# Patient Record
Sex: FEMALE | Race: WHITE | NOT HISPANIC OR LATINO
[De-identification: names, ages, dates, MRNs, and addresses within clinical notes are randomized per-mention and may not be internally consistent; named-entity substitution may affect disease eponyms.]

---

## 2018-06-27 ENCOUNTER — RESULT REVIEW (OUTPATIENT)
Age: 58
End: 2018-06-27

## 2018-08-20 ENCOUNTER — RESULT REVIEW (OUTPATIENT)
Age: 58
End: 2018-08-20

## 2022-06-15 ENCOUNTER — RESULT REVIEW (OUTPATIENT)
Age: 62
End: 2022-06-15

## 2022-06-20 PROBLEM — Z00.00 ENCOUNTER FOR PREVENTIVE HEALTH EXAMINATION: Status: ACTIVE | Noted: 2022-06-20

## 2022-06-29 ENCOUNTER — APPOINTMENT (OUTPATIENT)
Dept: UROGYNECOLOGY | Facility: CLINIC | Age: 62
End: 2022-06-29

## 2022-06-29 DIAGNOSIS — N36.41 HYPERMOBILITY OF URETHRA: ICD-10-CM

## 2022-06-29 DIAGNOSIS — R39.198 OTHER DIFFICULTIES WITH MICTURITION: ICD-10-CM

## 2022-06-29 PROCEDURE — 99204 OFFICE O/P NEW MOD 45 MIN: CPT | Mod: 25

## 2022-06-29 PROCEDURE — 51701 INSERT BLADDER CATHETER: CPT

## 2022-06-30 ENCOUNTER — NON-APPOINTMENT (OUTPATIENT)
Age: 62
End: 2022-06-30

## 2022-06-30 LAB
APPEARANCE: CLEAR
BACTERIA: NEGATIVE
BILIRUBIN URINE: NEGATIVE
BLOOD URINE: NEGATIVE
COLOR: COLORLESS
GLUCOSE QUALITATIVE U: NEGATIVE
HYALINE CASTS: 0 /LPF
KETONES URINE: NEGATIVE
LEUKOCYTE ESTERASE URINE: NEGATIVE
MICROSCOPIC-UA: NORMAL
NITRITE URINE: NEGATIVE
PH URINE: 7
PROTEIN URINE: NEGATIVE
RED BLOOD CELLS URINE: 0 /HPF
SPECIFIC GRAVITY URINE: 1.01
SQUAMOUS EPITHELIAL CELLS: 0 /HPF
UROBILINOGEN URINE: NORMAL
WHITE BLOOD CELLS URINE: 0 /HPF

## 2022-07-01 DIAGNOSIS — Z78.9 OTHER SPECIFIED HEALTH STATUS: ICD-10-CM

## 2022-07-01 DIAGNOSIS — Z82.49 FAMILY HISTORY OF ISCHEMIC HEART DISEASE AND OTHER DISEASES OF THE CIRCULATORY SYSTEM: ICD-10-CM

## 2022-07-01 RX ORDER — SULFAMETHOXAZOLE AND TRIMETHOPRIM 800; 160 MG/1; MG/1
800-160 TABLET ORAL
Qty: 6 | Refills: 0 | Status: ACTIVE | COMMUNITY
Start: 2022-07-01 | End: 1900-01-01

## 2022-07-08 NOTE — PROCEDURE
[FreeTextEntry1] : A catheterized urine was performed to rule out urinary tract infection and/or retention.  We reviewed that urine dipstick was positive for trace blood and we will send off for urinalysis with microscopy and culture.

## 2022-07-08 NOTE — HISTORY OF PRESENT ILLNESS
[Vaginal Wall Prolapse] : no [] : months ago [Rectal Prolapse] : no [Unable To Restrain Bowel Movement] : no [Urinary Frequency] : no [Urinary Tract Infection] : no [Feelings Of Urinary Urgency] : no [Urinary Frequency More Than Twice At Night (Nocturia)] : no nocturia [FreeTextEntry3] : sometimes  [FreeTextEntry5] : daily  [de-identified] : couple [de-identified] : sometimes  [de-identified] : + sexually active

## 2022-07-08 NOTE — DISCUSSION/SUMMARY
[FreeTextEntry1] : I reviewed the above findings with the patient and her daughter, Tita<  with visual illustrations. Treatment options for the prolapse were discussed and included doing nothing, Kegel exercises and behavioral modification, a pessary, or surgical correction.Surgically we discussed the abdominal vs the vaginal routes. Abdominally we discussed a hysterectomy and a sacral colpopexy   laparoscopically and robotically.  Vaginally we discussed a vaginal hysterectomy, uterosacral suspension, and anterior/posterior repair. Surgically we discussed hysteropexy as well as the use of biologics.  We discussed the pelvic ultrasound and urodynamic testing if she is interested in surgical correction.  She would like to think about her options and will call the office if she decides to proceed with treatment.  I UG a patient information on pelvic organ prolapse was given to her.  All questions were answered.\par

## 2022-07-08 NOTE — PHYSICAL EXAM
[Chaperone Present] : A chaperone was present in the examining room during all aspects of the physical examination [No Acute Distress] : in no acute distress [Well developed] : well developed [Well Nourished] : ~L well nourished [Normal Mood/Affect] : mood and affect are normal [Respirations regular] : ~T respiratory rate was regular [Warm and Dry] : was warm and dry to touch [Vulvar Atrophy] : vulvar atrophy [Normal Appearance] : general appearance was normal [Normal] : normal [Cystocele] : a cystocele [Uterine Prolapse] : uterine prolapse [Aa ____] : Aa [unfilled] [Ba ____] : Ba [unfilled] [C ____] : C [unfilled] [GH ____] : GH [unfilled] [PB ____] : PB [unfilled] [TVL ____] : TVL  [unfilled] [Ap ____] : Ap [unfilled] [Bp ____] : Bp [unfilled] [D ____] : D [unfilled] [Post Void Residual ____ml] : post void residual was [unfilled] ml [Normal rectal exam] : was normal [Anxiety] : patient is not anxious [Mass (___ Cm)] : no ~M [unfilled] abdominal mass was palpated [Tenderness] : ~T no ~M abdominal tenderness observed [Distended] : not distended [FreeTextEntry3] : + hypermobility

## 2022-08-04 ENCOUNTER — RESULT REVIEW (OUTPATIENT)
Age: 62
End: 2022-08-04

## 2022-08-04 ENCOUNTER — APPOINTMENT (OUTPATIENT)
Dept: ULTRASOUND IMAGING | Facility: IMAGING CENTER | Age: 62
End: 2022-08-04

## 2022-08-04 ENCOUNTER — OUTPATIENT (OUTPATIENT)
Dept: OUTPATIENT SERVICES | Facility: HOSPITAL | Age: 62
LOS: 1 days | End: 2022-08-04
Payer: MEDICAID

## 2022-08-04 DIAGNOSIS — N81.2 INCOMPLETE UTEROVAGINAL PROLAPSE: ICD-10-CM

## 2022-08-04 PROCEDURE — 76830 TRANSVAGINAL US NON-OB: CPT

## 2022-08-04 PROCEDURE — 76830 TRANSVAGINAL US NON-OB: CPT | Mod: 26

## 2022-08-04 PROCEDURE — 76856 US EXAM PELVIC COMPLETE: CPT

## 2022-08-04 PROCEDURE — 76856 US EXAM PELVIC COMPLETE: CPT | Mod: 26

## 2022-08-10 ENCOUNTER — NON-APPOINTMENT (OUTPATIENT)
Age: 62
End: 2022-08-10

## 2022-08-17 ENCOUNTER — OUTPATIENT (OUTPATIENT)
Dept: OUTPATIENT SERVICES | Facility: HOSPITAL | Age: 62
LOS: 1 days | End: 2022-08-17
Payer: MEDICAID

## 2022-08-17 ENCOUNTER — APPOINTMENT (OUTPATIENT)
Dept: UROGYNECOLOGY | Facility: CLINIC | Age: 62
End: 2022-08-17

## 2022-08-17 DIAGNOSIS — R31.9 HEMATURIA, UNSPECIFIED: ICD-10-CM

## 2022-08-17 LAB
BILIRUB UR QL STRIP: NEGATIVE
CLARITY UR: CLEAR
COLLECTION METHOD: NORMAL
GLUCOSE UR-MCNC: NEGATIVE
HCG UR QL: 0.2 EU/DL
HGB UR QL STRIP.AUTO: NORMAL
KETONES UR-MCNC: NEGATIVE
LEUKOCYTE ESTERASE UR QL STRIP: NEGATIVE
NITRITE UR QL STRIP: NEGATIVE
PH UR STRIP: 7
PROT UR STRIP-MCNC: NEGATIVE
SP GR UR STRIP: 1.01

## 2022-08-17 PROCEDURE — 51729 CYSTOMETROGRAM W/VP&UP: CPT

## 2022-08-17 PROCEDURE — 51797 INTRAABDOMINAL PRESSURE TEST: CPT | Mod: 26

## 2022-08-17 PROCEDURE — 51797 INTRAABDOMINAL PRESSURE TEST: CPT

## 2022-08-17 PROCEDURE — 51784 ANAL/URINARY MUSCLE STUDY: CPT | Mod: 26

## 2022-08-17 PROCEDURE — 51784 ANAL/URINARY MUSCLE STUDY: CPT

## 2022-08-17 PROCEDURE — 51729 CYSTOMETROGRAM W/VP&UP: CPT | Mod: 26

## 2022-08-18 ENCOUNTER — NON-APPOINTMENT (OUTPATIENT)
Age: 62
End: 2022-08-18

## 2022-08-19 LAB — BACTERIA UR CULT: NORMAL

## 2022-08-31 DIAGNOSIS — Z01.818 ENCOUNTER FOR OTHER PREPROCEDURAL EXAMINATION: ICD-10-CM

## 2022-11-02 ENCOUNTER — APPOINTMENT (OUTPATIENT)
Dept: UROGYNECOLOGY | Facility: CLINIC | Age: 62
End: 2022-11-02

## 2022-11-02 PROCEDURE — 99214 OFFICE O/P EST MOD 30 MIN: CPT

## 2022-11-03 NOTE — HISTORY OF PRESENT ILLNESS
[FreeTextEntry1] : Bárbara is a 61yo with incomplete uterovaginal prolapse, cystocele and occult HAYDEN who presents today for follow up. She is considering surgical intervention for her prolapse. She denies any changes since last visit. \par \par She recently underwent UDS on 8/17/22 which revealed +-300cc, no DO, longterm 347cc, PVR 0cc. She denies experiencing any HAYDEN at this time with the prolapse. \par \par PMH: HTN \par

## 2022-11-03 NOTE — DISCUSSION/SUMMARY
[FreeTextEntry1] : We reviewed the above findings with the patient with visual illustrations. Treatment options for the prolapse were discussed and included doing nothing, Kegel exercises and behavioral modification, a pessary, or surgical correction. Surgically we discussed the abdominal vs the vaginal routes. Abdominally we discussed a hysterectomy and a sacral colpopexy   laparoscopically and robotically.  Vaginally we discussed a vaginal hysterectomy, uterosacral suspension, and anterior/posterior repair. Surgically we discussed hysteropexy as well as the use of biologics. She would like to proceed with abdominal robotic approach with hysterectomy and sacrocolpopexy. \par \par We also discussed UDS findings of HAYDEN. We reviewed treatment options for the stress incontinence and included doing nothing, behavioral modification, Kegel's exercises with and without PT, medications, a pessary or intravaginal devices, periurethral bulking, and surgical correction with a suburethral sling v [place] v autologous sling. She is interested in pursuing a sling at the time of surgery. We also reviewed that while no evidence of detrusor instability, it does not mean it doesn't exist or may develop in the future. \par  \par [facility] handout on sacrocolpopexy and MUS provided.

## 2022-11-21 ENCOUNTER — APPOINTMENT (OUTPATIENT)
Dept: UROGYNECOLOGY | Facility: CLINIC | Age: 62
End: 2022-11-21

## 2022-11-21 VITALS
TEMPERATURE: 96.9 F | WEIGHT: 159 LBS | HEIGHT: 64 IN | DIASTOLIC BLOOD PRESSURE: 72 MMHG | SYSTOLIC BLOOD PRESSURE: 140 MMHG | BODY MASS INDEX: 27.14 KG/M2

## 2022-11-21 PROCEDURE — 51701 INSERT BLADDER CATHETER: CPT

## 2022-11-21 PROCEDURE — 99214 OFFICE O/P EST MOD 30 MIN: CPT | Mod: 25

## 2022-11-21 NOTE — PHYSICAL EXAM
[Chaperone Present] : A chaperone was present in the examining room during all aspects of the physical examination [No Acute Distress] : in no acute distress [Well developed] : well developed [Well Nourished] : ~L well nourished [Normal Mood/Affect] : mood and affect are normal [Respirations regular] : ~T respiratory rate was regular [Warm and Dry] : was warm and dry to touch [Vulvar Atrophy] : vulvar atrophy [Labia Majora] : were normal [Labia Minora] : were normal [Normal Appearance] : general appearance was normal [Cystocele] : a cystocele [Uterine Prolapse] : uterine prolapse [Aa ____] : Aa [unfilled] [Ba ____] : Ba [unfilled] [C ____] : C [unfilled] [GH ____] : GH [unfilled] [PB ____] : PB [unfilled] [TVL ____] : TVL  [unfilled] [Ap ____] : Ap [unfilled] [Bp ____] : Bp [unfilled] [D ____] : D [unfilled] [Normal] : normal [Post Void Residual ____ml] : post void residual was [unfilled] ml [Anxiety] : patient is not anxious [Mass (___ Cm)] : no ~M [unfilled] abdominal mass was palpated [Tenderness] : ~T no ~M abdominal tenderness observed [Distended] : not distended [FreeTextEntry3] : + hypermobility

## 2022-11-21 NOTE — HISTORY OF PRESENT ILLNESS
[FreeTextEntry1] : Patient is a 62F with Stage III prolapse, occult HAYDEN here for pre-surgical counseling.  Patient denies any new complaints.  She continues to desire surgical management.  Her pre-operative workup is as follows:\par \par UDS (11/2022): +-300cc, no DO, senior care 347cc, PVR 0cc\par Pelvic US (8/2022): 8.4 x 4.71 x 6.6 cm uterus, endometrium 2 mm; right ovary 2.1 x 1.2 x 1.8 cm; left ovary 2.2 x 1.1 x 2.3 cm\par Pap (6/2022): negative for intraepithelial lesion or malignancy\par \par PMH: HTN\par PSH: none\par

## 2022-11-21 NOTE — PROCEDURE
[Straight Catheterization] : insertion of a straight catheter [Stress Incontinence] : stress incontinence [Patient] : the patient [None] : none [___ Fr Straight Tip] : a [unfilled] in [place] straight tip catheter [Clear] : clear [No Complications] : no complications [Tolerated Well] : the patient tolerated the procedure well

## 2022-11-21 NOTE — DISCUSSION/SUMMARY
[FreeTextEntry1] : Bárbara is a 63y/o with Stage III prolapse and occult stress urinary incontinence who presents with daughter for pre-surgical counseling. We reviewed management options for her prolapse including: observation, pelvic floor exercises, resuming pessary use, surgical management. We reviewed various surgical management options including vaginal, laparoscopic/robotic, and abdominal procedures. We also reviewed both mesh and non-mesh options. She continues to be interested in robotic supracervical hysterectomy and sacrocolpopexy. We discussed her UDS findings of HAYDEN and reviewed treatment options. She desires a midurethral sling at the time of her prolapse repair. We discussed options of mini-sling, retropubic, and transobturator midurethral sling, and patient desires retropubic mid-urethral sling. \par \par She denies a history of abnormal Pap smears in the past, and a recent Pap smear is negative. Risks and benefits of total hysterectomy vs. supracervical hysterectomy were discussed with patient, and she opted for supracervical hysterectomy. She is aware that her cervix will be left in situ and she will require continued routine screening. We discussed the risks and benefits of removing her ovaries at the time of surgery, and she would like to keep her ovaries in situ. She would like to proceed with a bilateral salpingectomy at the time of the procedure.\par \par We reviewed risks of the surgery including but not limited to: bleeding, infection, pain, urinary retention, persistence or worsening of stress urinary incontinence, development of urge incontinence, voiding dysfunction, mesh erosion, failure to reduce prolapse, prolapse recurrence, dyspareunia, fistula formation, and neuropathy. We discussed the risk of possible conversion to open surgery via exploratory laparotomy. We discussed the risk of injury to her bladder, ureters, urethra, vagina, rectum and bowel.  We discussed the possibility of going home with a catheter. The risks and procedure details were explained in layman's terms and she expressed understanding of all of these risks. We discussed the elective nature of the surgery and we discussed that she is choosing to undergo this surgery despite awareness and understanding of procedure risks. We reviewed her hospital stay as well as preoperative and postoperative instructions.\par \par Patient signed consent for: pelvic exam under anesthesia, robotic/laparoscopic-assisted supracervical hysterectomy, sacrocolpopexy, retropubic midurethral sling, cystoscopy, possible anterior/posterior repair, oophorectomy, laparoscopy, exploratory laparotomy. Pt understands that pelvic exam under anesthesia can be performed by learners (medical students/residents/fellows).  Patient verbalized understanding. All questions answered.\par \par -------\par \par Study Protocol: Clinical Effectiveness of pre-incision vs. post-incision local anesthetic during laparoscopic/robotic sacrocolpopexy\par \par Patient met all the inclusion criteria and did not meet any exclusion criteria for the CLAPPS trial and was enrolled today. The study was explained; the subject had an opportunity to ask questions, and agrees to participate. Consent was obtained prior to the start of any study procedures. A copy of the signed consent form and the contact information of research staff was given to the patient. The patient will be given a study ID and randomized on the day of her surgery.\par \par 
sharp

## 2022-11-23 LAB — BACTERIA UR CULT: NORMAL

## 2022-12-05 ENCOUNTER — OUTPATIENT (OUTPATIENT)
Dept: OUTPATIENT SERVICES | Facility: HOSPITAL | Age: 62
LOS: 1 days | End: 2022-12-05
Payer: MEDICAID

## 2022-12-05 VITALS
HEART RATE: 62 BPM | TEMPERATURE: 98 F | WEIGHT: 154.1 LBS | RESPIRATION RATE: 16 BRPM | SYSTOLIC BLOOD PRESSURE: 124 MMHG | HEIGHT: 64 IN | DIASTOLIC BLOOD PRESSURE: 82 MMHG | OXYGEN SATURATION: 98 %

## 2022-12-05 DIAGNOSIS — N39.3 STRESS INCONTINENCE (FEMALE) (MALE): ICD-10-CM

## 2022-12-05 DIAGNOSIS — Z29.9 ENCOUNTER FOR PROPHYLACTIC MEASURES, UNSPECIFIED: ICD-10-CM

## 2022-12-05 DIAGNOSIS — N81.2 INCOMPLETE UTEROVAGINAL PROLAPSE: ICD-10-CM

## 2022-12-05 DIAGNOSIS — Z01.818 ENCOUNTER FOR OTHER PREPROCEDURAL EXAMINATION: ICD-10-CM

## 2022-12-05 DIAGNOSIS — I10 ESSENTIAL (PRIMARY) HYPERTENSION: ICD-10-CM

## 2022-12-05 LAB
A1C WITH ESTIMATED AVERAGE GLUCOSE RESULT: 5.9 % — HIGH (ref 4–5.6)
ANION GAP SERPL CALC-SCNC: 12 MMOL/L — SIGNIFICANT CHANGE UP (ref 5–17)
BLD GP AB SCN SERPL QL: NEGATIVE — SIGNIFICANT CHANGE UP
BUN SERPL-MCNC: 12 MG/DL — SIGNIFICANT CHANGE UP (ref 7–23)
CALCIUM SERPL-MCNC: 10.2 MG/DL — SIGNIFICANT CHANGE UP (ref 8.4–10.5)
CHLORIDE SERPL-SCNC: 104 MMOL/L — SIGNIFICANT CHANGE UP (ref 96–108)
CO2 SERPL-SCNC: 25 MMOL/L — SIGNIFICANT CHANGE UP (ref 22–31)
CREAT SERPL-MCNC: 0.66 MG/DL — SIGNIFICANT CHANGE UP (ref 0.5–1.3)
EGFR: 100 ML/MIN/1.73M2 — SIGNIFICANT CHANGE UP
ESTIMATED AVERAGE GLUCOSE: 123 MG/DL — HIGH (ref 68–114)
GLUCOSE SERPL-MCNC: 96 MG/DL — SIGNIFICANT CHANGE UP (ref 70–99)
HCT VFR BLD CALC: 41.8 % — SIGNIFICANT CHANGE UP (ref 34.5–45)
HGB BLD-MCNC: 13.7 G/DL — SIGNIFICANT CHANGE UP (ref 11.5–15.5)
MCHC RBC-ENTMCNC: 28.5 PG — SIGNIFICANT CHANGE UP (ref 27–34)
MCHC RBC-ENTMCNC: 32.8 GM/DL — SIGNIFICANT CHANGE UP (ref 32–36)
MCV RBC AUTO: 87.1 FL — SIGNIFICANT CHANGE UP (ref 80–100)
NRBC # BLD: 0 /100 WBCS — SIGNIFICANT CHANGE UP (ref 0–0)
PLATELET # BLD AUTO: 286 K/UL — SIGNIFICANT CHANGE UP (ref 150–400)
POTASSIUM SERPL-MCNC: 4.1 MMOL/L — SIGNIFICANT CHANGE UP (ref 3.5–5.3)
POTASSIUM SERPL-SCNC: 4.1 MMOL/L — SIGNIFICANT CHANGE UP (ref 3.5–5.3)
RBC # BLD: 4.8 M/UL — SIGNIFICANT CHANGE UP (ref 3.8–5.2)
RBC # FLD: 12.4 % — SIGNIFICANT CHANGE UP (ref 10.3–14.5)
RH IG SCN BLD-IMP: POSITIVE — SIGNIFICANT CHANGE UP
SODIUM SERPL-SCNC: 141 MMOL/L — SIGNIFICANT CHANGE UP (ref 135–145)
WBC # BLD: 3.91 K/UL — SIGNIFICANT CHANGE UP (ref 3.8–10.5)
WBC # FLD AUTO: 3.91 K/UL — SIGNIFICANT CHANGE UP (ref 3.8–10.5)

## 2022-12-05 PROCEDURE — G0463: CPT

## 2022-12-05 PROCEDURE — 80048 BASIC METABOLIC PNL TOTAL CA: CPT

## 2022-12-05 PROCEDURE — 85027 COMPLETE CBC AUTOMATED: CPT

## 2022-12-05 PROCEDURE — 86900 BLOOD TYPING SEROLOGIC ABO: CPT

## 2022-12-05 PROCEDURE — 86850 RBC ANTIBODY SCREEN: CPT

## 2022-12-05 PROCEDURE — 83036 HEMOGLOBIN GLYCOSYLATED A1C: CPT

## 2022-12-05 PROCEDURE — 86901 BLOOD TYPING SEROLOGIC RH(D): CPT

## 2022-12-05 RX ORDER — CEFOTETAN DISODIUM 1 G
2 VIAL (EA) INJECTION ONCE
Refills: 0 | Status: DISCONTINUED | OUTPATIENT
Start: 2022-12-20 | End: 2022-12-21

## 2022-12-05 RX ORDER — SODIUM CHLORIDE 9 MG/ML
3 INJECTION INTRAMUSCULAR; INTRAVENOUS; SUBCUTANEOUS EVERY 8 HOURS
Refills: 0 | Status: DISCONTINUED | OUTPATIENT
Start: 2022-12-20 | End: 2022-12-20

## 2022-12-05 RX ORDER — CHLORHEXIDINE GLUCONATE 213 G/1000ML
1 SOLUTION TOPICAL DAILY
Refills: 0 | Status: DISCONTINUED | OUTPATIENT
Start: 2022-12-20 | End: 2022-12-20

## 2022-12-05 NOTE — H&P PST ADULT - PROBLEM SELECTOR PLAN 3
Take HCTZ on day of surgery with small sips of water The Caprini score indicates this patient is at risk for a VTE event (score 3-5).  Most surgical patients in this group would benefit from pharmacologic prophylaxis.  The surgical team will determine the balance between VTE risk and bleeding risk

## 2022-12-05 NOTE — H&P PST ADULT - PROBLEM SELECTOR PLAN 1
Laparoscopic robotic supracervical hysterectomy  Labs: CBC, BMP, T&S  DOS: ABO, PT/INR Laparoscopic robotic supracervical hysterectomy  Labs: CBC, BMP, T&S, A1c done in PST  DOS: ABO, FS  -Obtain [facility] form PCP, pt to scheduled appt

## 2022-12-05 NOTE — H&P PST ADULT - NSICDXPASTMEDICALHX_GEN_ALL_CORE_FT
PAST MEDICAL HISTORY:  2019 novel coronavirus disease (COVID-19) early 2020 no hospitalization    Hypertension     Incomplete uterine prolapse     Stress incontinence, female

## 2022-12-05 NOTE — H&P PST ADULT - HISTORY OF PRESENT ILLNESS
61 y/o F, HTN, Uterine Prolapse, Stress incontinence, reports having low pelvic pressure ~ 1.5 months, dx with uterine prolapse, accompanying symptoms of urinary hesitation and stress incontinence. She is scheduled for Laparoscopic robotic supracervical hysterectomy, Midurethral sling with Dr. Johnson on 12/20/2022. No recent travel, no recent sick contact contacts.      **Covid Testing Pending, will contact Dr. Johnson and go to [facility] place of residence.

## 2022-12-05 NOTE — H&P PST ADULT - ASSESSMENT
CAPRINI SCORE [CLOT]    AGE RELATED RISK FACTORS                                                       MOBILITY RELATED FACTORS  [X ] Age 41-60 years                                            (1 Point)                  [ ] Bed rest                                                        (1 Point)  [ ] Age: 61-74 years                                           (2 Points)                 [ ] Plaster cast                                                   (2 Points)  [ ] Age= 75 years                                              (3 Points)                 [ ] Bed bound for more than 72 hours                 (2 Points)    DISEASE RELATED RISK FACTORS                                               GENDER SPECIFIC FACTORS  [ ] Edema in the lower extremities                       (1 Point)                  [ ] Pregnancy                                                     (1 Point)  [ ] Varicose veins                                               (1 Point)                  [ ] Post-partum < 6 weeks                                   (1 Point)             [X ] BMI > 25 Kg/m2                                            (1 Point)                  [ ] Hormonal therapy  or oral contraception          (1 Point)                 [ ] Sepsis (in the previous month)                        (1 Point)                  [ ] History of pregnancy complications                 (1 point)  [ ] Pneumonia or serious lung disease                                               [ ] Unexplained or recurrent                     (1 Point)           (in the previous month)                               (1 Point)  [ ] Abnormal pulmonary function test                     (1 Point)                 SURGERY RELATED RISK FACTORS  [ ] Acute myocardial infarction                              (1 Point)                 [ ]  Section                                             (1 Point)  [ ] Congestive heart failure (in the previous month)  (1 Point)               [ ] Minor surgery                                                  (1 Point)   [ ] Inflammatory bowel disease                             (1 Point)                 [ ] Arthroscopic surgery                                        (2 Points)  [ ] Central venous access                                      (2 Points)                [ ] General surgery lasting more than 45 minutes   (2 Points)       [ ] Stroke (in the previous month)                          (5 Points)               [ ] Elective arthroplasty                                         (5 Points)                                                                                                                                               HEMATOLOGY RELATED FACTORS                                                 TRAUMA RELATED RISK FACTORS  [ ] Prior episodes of VTE                                     (3 Points)                [ ] Fracture of the hip, pelvis, or leg                       (5 Points)  [ ] Positive family history for VTE                         (3 Points)                 [ ] Acute spinal cord injury (in the previous month)  (5 Points)  [ ] Prothrombin 88912 A                                     (3 Points)                 [ ] Paralysis  (less than 1 month)                             (5 Points)  [ ] Factor V Leiden                                             (3 Points)                  [ ] Multiple Trauma within 1 month                        (5 Points)  [ ] Lupus anticoagulants                                     (3 Points)                                                           [ ] Anticardiolipin antibodies                               (3 Points)                                                       [ ] High homocysteine in the blood                      (3 Points)                                             [ ] Other congenital or acquired thrombophilia      (3 Points)                                                [ ] Heparin induced thrombocytopenia                  (3 Points)                                          Total Score [      3    ]    Caprini Score 0 - 2:  Low Risk, No VTE Prophylaxis required for most patients, encourage ambulation  Caprini Score 3 - 6:  At Risk, pharmacologic VTE prophylaxis is indicated for most patients (in the absence of a contraindication)  Caprini Score Greater than or = 7:  High Risk, pharmacologic VTE prophylaxis is indicated for most patients (in the absence of a contraindication)

## 2022-12-19 ENCOUNTER — TRANSCRIPTION ENCOUNTER (OUTPATIENT)
Age: 62
End: 2022-12-19

## 2022-12-20 ENCOUNTER — APPOINTMENT (OUTPATIENT)
Dept: UROGYNECOLOGY | Facility: HOSPITAL | Age: 62
End: 2022-12-20
Payer: MEDICAID

## 2022-12-20 ENCOUNTER — INPATIENT (INPATIENT)
Facility: HOSPITAL | Age: 62
LOS: 0 days | Discharge: HOME | DRG: 743 | End: 2022-12-21
Attending: UROLOGY | Admitting: UROLOGY
Payer: MEDICAID

## 2022-12-20 ENCOUNTER — TRANSCRIPTION ENCOUNTER (OUTPATIENT)
Age: 62
End: 2022-12-20

## 2022-12-20 VITALS
TEMPERATURE: 98 F | OXYGEN SATURATION: 99 % | DIASTOLIC BLOOD PRESSURE: 98 MMHG | HEIGHT: 64 IN | HEART RATE: 67 BPM | WEIGHT: 154.1 LBS | SYSTOLIC BLOOD PRESSURE: 170 MMHG | RESPIRATION RATE: 18 BRPM

## 2022-12-20 DIAGNOSIS — N81.2 INCOMPLETE UTEROVAGINAL PROLAPSE: ICD-10-CM

## 2022-12-20 DIAGNOSIS — N39.3 STRESS INCONTINENCE (FEMALE) (MALE): ICD-10-CM

## 2022-12-20 LAB
GLUCOSE BLDC GLUCOMTR-MCNC: 117 MG/DL — HIGH (ref 70–99)
RH IG SCN BLD-IMP: POSITIVE — SIGNIFICANT CHANGE UP
SARS-COV-2 N GENE NPH QL NAA+PROBE: NOT DETECTED

## 2022-12-20 PROCEDURE — 57288 REPAIR BLADDER DEFECT: CPT

## 2022-12-20 PROCEDURE — 58542 LSH W/T/O UT 250 G OR LESS: CPT

## 2022-12-20 PROCEDURE — 57425 LAPAROSCOPY SURG COLPOPEXY: CPT

## 2022-12-20 DEVICE — SLING TRANSVAGINAL MID-URETHRAL ADVANTAGE FIT BLUE
Type: IMPLANTABLE DEVICE | Status: NON-FUNCTIONAL
Removed: 2022-12-20

## 2022-12-20 DEVICE — MESH UPSYLON Y
Type: IMPLANTABLE DEVICE | Status: NON-FUNCTIONAL
Removed: 2022-12-20

## 2022-12-20 RX ORDER — GABAPENTIN 400 MG/1
600 CAPSULE ORAL ONCE
Refills: 0 | Status: COMPLETED | OUTPATIENT
Start: 2022-12-20 | End: 2022-12-20

## 2022-12-20 RX ORDER — POLYETHYLENE GLYCOL 3350 17 G/17G
17 POWDER, FOR SOLUTION ORAL AT BEDTIME
Refills: 0 | Status: DISCONTINUED | OUTPATIENT
Start: 2022-12-20 | End: 2022-12-21

## 2022-12-20 RX ORDER — ACETAMINOPHEN 500 MG
1000 TABLET ORAL EVERY 6 HOURS
Refills: 0 | Status: DISCONTINUED | OUTPATIENT
Start: 2022-12-20 | End: 2022-12-21

## 2022-12-20 RX ORDER — KETOROLAC TROMETHAMINE 30 MG/ML
15 SYRINGE (ML) INJECTION EVERY 8 HOURS
Refills: 0 | Status: DISCONTINUED | OUTPATIENT
Start: 2022-12-20 | End: 2022-12-21

## 2022-12-20 RX ORDER — GABAPENTIN 400 MG/1
300 CAPSULE ORAL EVERY 8 HOURS
Refills: 0 | Status: DISCONTINUED | OUTPATIENT
Start: 2022-12-20 | End: 2022-12-21

## 2022-12-20 RX ORDER — HYDROMORPHONE HYDROCHLORIDE 2 MG/ML
0.5 INJECTION INTRAMUSCULAR; INTRAVENOUS; SUBCUTANEOUS
Refills: 0 | Status: DISCONTINUED | OUTPATIENT
Start: 2022-12-20 | End: 2022-12-20

## 2022-12-20 RX ORDER — ACETAMINOPHEN 500 MG
1000 TABLET ORAL ONCE
Refills: 0 | Status: COMPLETED | OUTPATIENT
Start: 2022-12-20 | End: 2022-12-20

## 2022-12-20 RX ORDER — METOPROLOL TARTRATE 50 MG
5 TABLET ORAL EVERY 6 HOURS
Refills: 0 | Status: DISCONTINUED | OUTPATIENT
Start: 2022-12-20 | End: 2022-12-21

## 2022-12-20 RX ORDER — SIMETHICONE 80 MG/1
80 TABLET, CHEWABLE ORAL EVERY 6 HOURS
Refills: 0 | Status: DISCONTINUED | OUTPATIENT
Start: 2022-12-20 | End: 2022-12-21

## 2022-12-20 RX ORDER — CELECOXIB 200 MG/1
400 CAPSULE ORAL ONCE
Refills: 0 | Status: COMPLETED | OUTPATIENT
Start: 2022-12-20 | End: 2022-12-20

## 2022-12-20 RX ORDER — OXYCODONE HYDROCHLORIDE 5 MG/1
5 TABLET ORAL
Refills: 0 | Status: DISCONTINUED | OUTPATIENT
Start: 2022-12-20 | End: 2022-12-21

## 2022-12-20 RX ORDER — ONDANSETRON 8 MG/1
4 TABLET, FILM COATED ORAL ONCE
Refills: 0 | Status: DISCONTINUED | OUTPATIENT
Start: 2022-12-20 | End: 2022-12-21

## 2022-12-20 RX ORDER — SODIUM CHLORIDE 9 MG/ML
1000 INJECTION, SOLUTION INTRAVENOUS
Refills: 0 | Status: DISCONTINUED | OUTPATIENT
Start: 2022-12-20 | End: 2022-12-21

## 2022-12-20 RX ORDER — HYDROCHLOROTHIAZIDE 25 MG
1 TABLET ORAL
Qty: 0 | Refills: 0 | DISCHARGE

## 2022-12-20 RX ORDER — ONDANSETRON 8 MG/1
4 TABLET, FILM COATED ORAL ONCE
Refills: 0 | Status: COMPLETED | OUTPATIENT
Start: 2022-12-20 | End: 2022-12-20

## 2022-12-20 RX ADMIN — CHLORHEXIDINE GLUCONATE 1 APPLICATION(S): 213 SOLUTION TOPICAL at 11:00

## 2022-12-20 RX ADMIN — Medication 15 MILLIGRAM(S): at 23:48

## 2022-12-20 RX ADMIN — Medication 1000 MILLIGRAM(S): at 13:58

## 2022-12-20 RX ADMIN — ONDANSETRON 4 MILLIGRAM(S): 8 TABLET, FILM COATED ORAL at 22:08

## 2022-12-20 RX ADMIN — GABAPENTIN 300 MILLIGRAM(S): 400 CAPSULE ORAL at 23:48

## 2022-12-20 RX ADMIN — CELECOXIB 400 MILLIGRAM(S): 200 CAPSULE ORAL at 13:58

## 2022-12-20 RX ADMIN — GABAPENTIN 600 MILLIGRAM(S): 400 CAPSULE ORAL at 13:58

## 2022-12-20 RX ADMIN — Medication 400 MILLIGRAM(S): at 20:30

## 2022-12-20 NOTE — CHART NOTE - NSCHARTNOTEFT_GEN_A_CORE
Patient seen and examined at bedside, recently post-op.   Patient is sleeping comfortably, easily arousable. No acute concerns at this time.    Vincent in place, has not yet ambulated. Tolerating clears.  Denies CP, SOB, N/V, fevers, and chills.    Vital Signs Last 24 Hours  T(C): 36.2 (12-20-22 @ 19:15), Max: 36.6 (12-20-22 @ 10:42)  HR: 73 (12-20-22 @ 19:45) (60 - 73)  BP: 102/59 (12-20-22 @ 19:45) (102/59 - 170/98)  RR: 17 (12-20-22 @ 19:45) (12 - 18)  SpO2: 96% (12-20-22 @ 19:45) (95% - 100%)    I&O's Summary    20 Dec 2022 07:01  -  20 Dec 2022 20:20  --------------------------------------------------------  IN: 0 mL / OUT: 560 mL / NET: -560 mL        Physical Exam:  Gen: Comfortable, NAD  Psych: appropriate mood & affect  CV: RRR  Pulm: CTAB  Abd: Soft, appropriately tender, non-distended, +BS  Incision: port sites x5 clean, dry, intact  : minimal spotting on pad  Ext: Warm & well perfused. No edema or tenderness bilaterally    MEDICATIONS  (STANDING):  acetaminophen     Tablet .. 1000 milliGRAM(s) Oral every 6 hours  acetaminophen   IVPB .. 1000 milliGRAM(s) IV Intermittent once  cefoTEtan  IVPB 2 Gram(s) IV Intermittent once  gabapentin 300 milliGRAM(s) Oral every 8 hours  ketorolac   Injectable 15 milliGRAM(s) IV Push every 8 hours  metoprolol tartrate Injectable 5 milliGRAM(s) IV Push every 6 hours  ondansetron Injectable 4 milliGRAM(s) IV Push once    MEDICATIONS  (PRN):  acetaminophen     Tablet .. 1000 milliGRAM(s) Oral every 6 hours PRN Mild Pain (1 - 3)  HYDROmorphone  Injectable 0.5 milliGRAM(s) IV Push every 10 minutes PRN Moderate Pain (4 - 6)  ondansetron Injectable 4 milliGRAM(s) IV Push once PRN Nausea and/or Vomiting  oxyCODONE    IR 5 milliGRAM(s) Oral every 3 hours PRN Severe Pain (7 - 10)  polyethylene glycol 3350 17 Gram(s) Oral at bedtime PRN Constipation  simethicone 80 milliGRAM(s) Chew every 6 hours PRN Gas      A/P: POD#0  s/p RA-LSC BELLA, SCP, MUS, cysto. Patient doing well post-op.   Neuro: Tylenol, Toradol, Gabapentin, Oxy PRN  CV: Hemodynamically stable.  Monitor VS.  AM CBC.   - h/o HTN: Lopressor PRN  Pulm: Saturating well on room air.  Encourage OOB and incentive spirometer use.   GI: Regular diet. Anti-emetics PRN.  : Vincent to gravity vs.  UOP adequate.  TOV in AM.   FEN:  LR@75cc/hour.   Heme: SCD's while in bed.   ID: Afebrile, continue to monitor fever curve, leukoctyosis.   Endo:  No active issues     Dispo: Cont inpatient post op management, TOV in AM.     D/w Dr Mynor Echavarria-Walter PGY2

## 2022-12-20 NOTE — BRIEF OPERATIVE NOTE - OPERATION/FINDINGS
Incomplete uterovaginal prolapse. Uterus approximately 8cm in largest dimension with ~3cm anterior myoma. Grossly normal appearing bilateral fallopian tubes and ovaries. On cystoscopy, normal bladder mucosa and no suture, mesh, injury,  or foreign body noted.  Bilateral ureteral orifices were identified and effluxing clear yellow urine.

## 2022-12-20 NOTE — BRIEF OPERATIVE NOTE - NSICDXBRIEFPROCEDURE_GEN_ALL_CORE_FT
PROCEDURES:  Robot-assisted supracervical hysterectomy with sacrocolpopexy 20-Dec-2022 17:38:39  Natalia Lowe  Robot-assisted bilateral salpingectomy 20-Dec-2022 17:39:14  Natalia Lowe  Creation, midurethral sling, with cystoscopy 20-Dec-2022 17:39:26  Natalia Lowe

## 2022-12-20 NOTE — BRIEF OPERATIVE NOTE - NSICDXBRIEFPREOP_GEN_ALL_CORE_FT
PRE-OP DIAGNOSIS:  Stress incontinence of urine 20-Dec-2022 17:40:23  Natalia Lowe  Female cystocele 20-Dec-2022 17:40:12  Natalia Lowe  Incomplete uterovaginal prolapse 20-Dec-2022 17:40:03  Natalia Lowe

## 2022-12-20 NOTE — BRIEF OPERATIVE NOTE - NSICDXBRIEFPOSTOP_GEN_ALL_CORE_FT
POST-OP DIAGNOSIS:  Incomplete uterovaginal prolapse 20-Dec-2022 17:40:39  Natalia Lowe  Female cystocele 20-Dec-2022 17:40:32  Natalia Lowe  Stress incontinence of urine 20-Dec-2022 17:40:28  Natalia Lowe

## 2022-12-21 ENCOUNTER — TRANSCRIPTION ENCOUNTER (OUTPATIENT)
Age: 62
End: 2022-12-21

## 2022-12-21 VITALS — HEART RATE: 63 BPM | SYSTOLIC BLOOD PRESSURE: 123 MMHG | DIASTOLIC BLOOD PRESSURE: 74 MMHG

## 2022-12-21 LAB
HCT VFR BLD CALC: 36 % — SIGNIFICANT CHANGE UP (ref 34.5–45)
HGB BLD-MCNC: 11.7 G/DL — SIGNIFICANT CHANGE UP (ref 11.5–15.5)
MCHC RBC-ENTMCNC: 27.9 PG — SIGNIFICANT CHANGE UP (ref 27–34)
MCHC RBC-ENTMCNC: 32.5 GM/DL — SIGNIFICANT CHANGE UP (ref 32–36)
MCV RBC AUTO: 85.9 FL — SIGNIFICANT CHANGE UP (ref 80–100)
NRBC # BLD: 0 /100 WBCS — SIGNIFICANT CHANGE UP (ref 0–0)
PLATELET # BLD AUTO: 213 K/UL — SIGNIFICANT CHANGE UP (ref 150–400)
RBC # BLD: 4.19 M/UL — SIGNIFICANT CHANGE UP (ref 3.8–5.2)
RBC # FLD: 12.2 % — SIGNIFICANT CHANGE UP (ref 10.3–14.5)
WBC # BLD: 7.69 K/UL — SIGNIFICANT CHANGE UP (ref 3.8–10.5)
WBC # FLD AUTO: 7.69 K/UL — SIGNIFICANT CHANGE UP (ref 3.8–10.5)

## 2022-12-21 PROCEDURE — C1781: CPT

## 2022-12-21 PROCEDURE — 85027 COMPLETE CBC AUTOMATED: CPT

## 2022-12-21 PROCEDURE — C1771: CPT

## 2022-12-21 PROCEDURE — S2900: CPT

## 2022-12-21 PROCEDURE — 82962 GLUCOSE BLOOD TEST: CPT

## 2022-12-21 PROCEDURE — 88307 TISSUE EXAM BY PATHOLOGIST: CPT

## 2022-12-21 PROCEDURE — C9399: CPT

## 2022-12-21 PROCEDURE — 88307 TISSUE EXAM BY PATHOLOGIST: CPT | Mod: 26

## 2022-12-21 RX ORDER — DOCUSATE SODIUM 100 MG
1 CAPSULE ORAL
Qty: 42 | Refills: 0
Start: 2022-12-21 | End: 2023-01-03

## 2022-12-21 RX ORDER — SODIUM CHLORIDE 9 MG/ML
3 INJECTION INTRAMUSCULAR; INTRAVENOUS; SUBCUTANEOUS EVERY 8 HOURS
Refills: 0 | Status: DISCONTINUED | OUTPATIENT
Start: 2022-12-21 | End: 2022-12-21

## 2022-12-21 RX ORDER — OXYCODONE HYDROCHLORIDE 5 MG/1
1 TABLET ORAL
Qty: 8 | Refills: 0
Start: 2022-12-21

## 2022-12-21 RX ORDER — IBUPROFEN 200 MG
600 TABLET ORAL EVERY 6 HOURS
Refills: 0 | Status: DISCONTINUED | OUTPATIENT
Start: 2022-12-21 | End: 2022-12-21

## 2022-12-21 RX ADMIN — GABAPENTIN 300 MILLIGRAM(S): 400 CAPSULE ORAL at 08:25

## 2022-12-21 RX ADMIN — Medication 1000 MILLIGRAM(S): at 05:04

## 2022-12-21 RX ADMIN — Medication 1000 MILLIGRAM(S): at 05:40

## 2022-12-21 RX ADMIN — Medication 600 MILLIGRAM(S): at 10:11

## 2022-12-21 RX ADMIN — Medication 15 MILLIGRAM(S): at 00:50

## 2022-12-21 RX ADMIN — Medication 600 MILLIGRAM(S): at 09:41

## 2022-12-21 NOTE — DISCHARGE NOTE NURSING/CASE MANAGEMENT/SOCIAL WORK - NSDCPEFALRISK_GEN_ALL_CORE
For information on Fall & Injury Prevention, visit: [email or web address] OR  [email or web address] OR  https://www.cdc.gov/steadi/patient.html

## 2022-12-21 NOTE — DISCHARGE NOTE NURSING/CASE MANAGEMENT/SOCIAL WORK - NSDCPNINST_GEN_ALL_CORE
Follow up appt per MD.  Notify for fever, nausea/vomitting, difficulty urinating, leg or chest pain, drainage or bleeding from incision sites and any other questions or concerns.

## 2022-12-21 NOTE — DISCHARGE NOTE PROVIDER - HOSPITAL COURSE
Pt underwent scheduled robotic assisted supracervical hysterectomy, bilateral salpingectomy, sacrocolpopexy, mid-urethral sling, and cystoscopy on 12/20/22 EBL was 75 cc Please see operative note for details. On POD#1 patient was tolerating a regular diet and passed an active trial of void. During postoperative course patient's vitals were stable, vaginal bleeding appropriate, and pain well controlled.  Post operative hematocrit trend was  appropriate for post-surgical change. At the time of discharge patient was ambulating, her pain controlled with oral medications, had adequate oral intake, and was voiding freely.  Discharge instructions and precautions were given.  Will have close follow up with Dr. Johnson.

## 2022-12-21 NOTE — DISCHARGE NOTE PROVIDER - NSDCCPCAREPLAN_GEN_ALL_CORE_FT
PRINCIPAL DISCHARGE DIAGNOSIS  Diagnosis: Incomplete uterine prolapse  Assessment and Plan of Treatment:

## 2022-12-21 NOTE — DISCHARGE NOTE PROVIDER - NSDCMRMEDTOKEN_GEN_ALL_CORE_FT
hydroCHLOROthiazide 25 mg oral tablet: 1 tab(s) orally once a day  oxyCODONE 5 mg oral tablet: 1 tab(s) orally every 8 hours, As Needed -Severe Pain (7 - 10) MDD:3 tabs   Colace 100 mg oral capsule: 1 cap(s) orally 3 times a day (with meals) -for constipation   hydroCHLOROthiazide 25 mg oral tablet: 1 tab(s) orally once a day  oxyCODONE 5 mg oral tablet: 1 tab(s) orally every 8 hours, As Needed -Severe Pain (7 - 10) MDD:3 tabs

## 2022-12-21 NOTE — PROGRESS NOTE ADULT - SUBJECTIVE AND OBJECTIVE BOX
POD#1   HD#2    Patient seen and examined at bedside.  No acute events overnight. No acute complaints.  Pain well controlled. "0/10".  Patient is ambulating without difficultly and tolerating a regular diet. Patient is voiding spontaneously vs. Vincent is still in place.   Denies CP, SOB, N/V, fevers, and chills.    Vital Signs Last 24 Hours  T(C): 36.5 (12-21-22 @ 05:18), Max: 36.6 (12-20-22 @ 10:42)  HR: 80 (12-21-22 @ 05:18) (60 - 83)  BP: 100/62 (12-21-22 @ 05:18) (98/65 - 170/98)  RR: 18 (12-21-22 @ 05:18) (12 - 18)  SpO2: 96% (12-21-22 @ 05:18) (95% - 100%)    I&O's Summary    20 Dec 2022 07:01  -  21 Dec 2022 07:00  --------------------------------------------------------  IN: 825 mL / OUT: 1410 mL / NET: -585 mL      Physical Exam:  General: NAD  CV: RR, S1, S2, no M/R/G  Lungs: CTA b/l, good air flow b/l   Abdomen: Soft, appropriately tender, normoactive bowel sounds  Incision: 5 Robotic port sites CDI - covered with Dermabond Early stage bruising around port sites.   : no bleeding on pad; LR@75  Ext: No pain or swelling     Labs:                        11.7   7.69  )-----------( 213      ( 21 Dec 2022 06:25 )             36.0   baso x      eos x      imm gran x      lymph x      mono x      poly x          MEDICATIONS  (STANDING):  acetaminophen     Tablet .. 1000 milliGRAM(s) Oral every 6 hours  cefoTEtan  IVPB 2 Gram(s) IV Intermittent once  gabapentin 300 milliGRAM(s) Oral every 8 hours  ketorolac   Injectable 15 milliGRAM(s) IV Push every 8 hours  metoprolol tartrate Injectable 5 milliGRAM(s) IV Push every 6 hours  ondansetron Injectable 4 milliGRAM(s) IV Push once  sodium chloride 0.9% lock flush 3 milliLiter(s) IV Push every 8 hours    MEDICATIONS  (PRN):  acetaminophen     Tablet .. 1000 milliGRAM(s) Oral every 6 hours PRN Mild Pain (1 - 3)  oxyCODONE    IR 5 milliGRAM(s) Oral every 3 hours PRN Severe Pain (7 - 10)  polyethylene glycol 3350 17 Gram(s) Oral at bedtime PRN Constipation  simethicone 80 milliGRAM(s) Chew every 6 hours PRN Gas       POD#1   HD#2    Patient seen and examined at bedside.  No acute events overnight. No acute complaints.  Pain well controlled. "0/10".  Patient reports episode of nausea and emesis x1 overnight, relieved with zofran. Denies any nausea this morning. She has not yet ambulated.  Vincent is still in place.   Denies CP, SOB, N/V, fevers, and chills.    Vital Signs Last 24 Hours  T(C): 36.5 (12-21-22 @ 05:18), Max: 36.6 (12-20-22 @ 10:42)  HR: 80 (12-21-22 @ 05:18) (60 - 83)  BP: 100/62 (12-21-22 @ 05:18) (98/65 - 170/98)  RR: 18 (12-21-22 @ 05:18) (12 - 18)  SpO2: 96% (12-21-22 @ 05:18) (95% - 100%)    I&O's Summary    20 Dec 2022 07:01  -  21 Dec 2022 07:00  --------------------------------------------------------  IN: 825 mL / OUT: 1410 mL / NET: -585 mL      Physical Exam:  General: NAD  CV: RR, S1, S2, no M/R/G  Lungs: CTA b/l, good air flow b/l   Abdomen: Soft, appropriately tender, normoactive bowel sounds  Incision: 5 Robotic port sites CDI - covered with Dermabond Early stage bruising around port sites.   : no bleeding on pad; LR@75  Ext: No pain or swelling     Labs:                        11.7   7.69  )-----------( 213      ( 21 Dec 2022 06:25 )             36.0   baso x      eos x      imm gran x      lymph x      mono x      poly x          MEDICATIONS  (STANDING):  acetaminophen     Tablet .. 1000 milliGRAM(s) Oral every 6 hours  cefoTEtan  IVPB 2 Gram(s) IV Intermittent once  gabapentin 300 milliGRAM(s) Oral every 8 hours  ketorolac   Injectable 15 milliGRAM(s) IV Push every 8 hours  metoprolol tartrate Injectable 5 milliGRAM(s) IV Push every 6 hours  ondansetron Injectable 4 milliGRAM(s) IV Push once  sodium chloride 0.9% lock flush 3 milliLiter(s) IV Push every 8 hours    MEDICATIONS  (PRN):  acetaminophen     Tablet .. 1000 milliGRAM(s) Oral every 6 hours PRN Mild Pain (1 - 3)  oxyCODONE    IR 5 milliGRAM(s) Oral every 3 hours PRN Severe Pain (7 - 10)  polyethylene glycol 3350 17 Gram(s) Oral at bedtime PRN Constipation  simethicone 80 milliGRAM(s) Chew every 6 hours PRN Gas

## 2022-12-21 NOTE — DISCHARGE NOTE PROVIDER - NSDCFUADDINST_GEN_ALL_CORE_FT
Discharge Instructions:  1. Diet: advance as tolerated  2. Activity limited by:   - no running, heavy lifting, strenuous exercise until cleared by MD   - nothing in vagina (bath, swim, intercourse, douching, tampons) until cleared by MD   3. Medications:   - Senna to prevent constipation (please hold for loose stools)  - Ibuprofen 600-800mg every 6 hours as needed for pain  - Acetaminophen 1000mg every 6 hours as needed for pain  - Oxycodone 5mg every 8 hours as needed for breakthrough pain.   4. Follow-up appointment - 2 weeks. Please call the office upon discharge to schedule your appointment if you do not have one already.   5. Precautions:  - Call the office or go to the ED if you have any of the followin) Fever >100.4 that does not resolve  2) Intractable pain  3) Heavy bleeding

## 2022-12-21 NOTE — DISCHARGE NOTE PROVIDER - NSDCFUSCHEDAPPT_GEN_ALL_CORE_FT
Lisandra Mackey Physician [facility]  UROGYN [address]  Scheduled Appointment: 01/11/2023

## 2022-12-21 NOTE — DISCHARGE NOTE PROVIDER - CARE PROVIDER_API CALL
Chandana Johnson; WILIAM)  Female Pelvic MedReconst Surg; Obstetrics and Gynecology  [address]  [place] [address]  Phone: [phone]  Fax: [phone]  Follow Up Time: 2 weeks

## 2022-12-21 NOTE — DISCHARGE NOTE PROVIDER - NSDCCPTREATMENT_GEN_ALL_CORE_FT
PRINCIPAL PROCEDURE  Procedure: Robot-assisted supracervical hysterectomy with sacrocolpopexy  Findings and Treatment:       SECONDARY PROCEDURE  Procedure: Creation, midurethral sling, with cystoscopy  Findings and Treatment:     Procedure: Robot-assisted bilateral salpingectomy  Findings and Treatment:

## 2022-12-21 NOTE — DISCHARGE NOTE NURSING/CASE MANAGEMENT/SOCIAL WORK - PATIENT PORTAL LINK FT
You can access the FollowMyHealth Patient Portal offered by [facility] by registering at the following website: [email or web address]. By joining [facility]’s FollowMyHealth portal, you will also be able to view your health information using other applications (apps) compatible with our system.

## 2022-12-21 NOTE — CHART NOTE - NSCHARTNOTEFT_GEN_A_CORE
NP GYN Chart Note    Pt eval at bedside for trial of void.  Pain well controlled prior to starting TOV.    Bladder fully drained through Vincent catheter with 125ml in bag. Retrograde filled with 300cc sterile water then clamped.    Patient assisted to restroom and Vincent removed.  Given necessary time to void.    Patient voided 400cc of urine, passing TOV.  To be discharged home without Vincent catheter.    d/w MD Mynor Bach FNP-BC

## 2022-12-21 NOTE — PROGRESS NOTE ADULT - ASSESSMENT
A/P: 62y POD#1  s/p RA BELLA BS SCP MUS and cystoscopy.  Patient is stable and doing well.      Neuro: Transition to only PO pain meds this AM.  CV: Hemodynamically stable. F/u AM CBC   Pulm: Saturating well on room air, encourage oob/amb  GI: Continue regular diet  : Will SLIV. UOP adequate, active TOV this AM  Heme: c/w  SCDs for DVT ppx  ID: Afebrile  Endo: No active issues   Dispo: Pending AM blood work and TOV     Ana Baltazar, PGY-2  A/P: 62y POD#1  s/p BA BLANCO BS SCP MUS and cystoscopy.  Patient is stable and doing well.      Neuro: Transition to only PO pain meds this AM.  CV: Hemodynamically stable. F/u AM CBC   Pulm: Saturating well on room air, encourage oob/amb  GI: Continue regular diet  : Will SLIV. UOP adequate, active TOV this AM  Heme: c/w  SCDs for DVT ppx  ID: Afebrile  Endo: No active issues   Dispo: Pending AM blood work and TOV     Ana Baltazar, PGY-2     --------------  Urogynecology Fellow Note     Patient reports that she is doing well. Reports episode of nausea with emesis last night, none since. She denies nausea, vomiting, fevers, chills. Reports that she tolerated small amount of food last night. She feels well overall.     VSS. UOP adequate. AM labs pending   Preop Hct 41.8, AM Hct 36.0     Exam:   Gen: NAD, resting comfortably in bed  CVS: RRR  Lung: no use of accessory muscles  Abdomen: Soft, nontender, nondistended. +BS. Incision sites covered with dermabond  : Vincent catheter draining clear yellow urine.   Ext: SCDs on and functioning     Assessment:   Bárbara is POD1 s/p JERICA, BS, SCP, MUS, cystoscopy doing well, in stable condition.     Plan:   -TOV today  -Continue diet regular  -Ambulation encouraged  -Reviewed discharge plan and instructions with patient  -Dispo: likely home pending the above    STEW Lowe MD PGY6 A/P: 62y POD#1  s/p BA BLANCO BS SCP MUS and cystoscopy.  Patient is stable and doing well.      Neuro: Transition to only PO pain meds this AM.  CV: Hemodynamically stable. F/u AM CBC   Pulm: Saturating well on room air, encourage oob/amb  GI: Continue regular diet  : Will SLIV. UOP adequate, active TOV this AM  Heme: c/w  SCDs for DVT ppx  ID: Afebrile  Endo: No active issues   Dispo: Pending AM blood work and TOV     Ana Baltazar, PGY-2     --------------  Urogynecology Fellow Note     Patient reports that she is doing well. Reports episode of nausea with emesis last night, none since. She denies nausea, vomiting, fevers, chills. Reports that she tolerated small amount of food last night. Has not yet ambulated. She feels well overall.     VSS. UOP adequate. AM labs pending   Preop Hct 41.8, AM Hct 36.0     Exam:   Gen: NAD, resting comfortably in bed  CVS: RRR  Lung: no use of accessory muscles  Abdomen: Soft, nontender, nondistended. +BS. Incision sites covered with dermabond  : Vincent catheter draining clear yellow urine.   Ext: SCDs on and functioning     Assessment:   Bárbara is POD1 s/p RA-BELLA, BS, SCP, MUS, cystoscopy doing well, in stable condition.     Plan:   -TOV today  -Continue diet regular  -Ambulation encouraged  -Reviewed discharge plan and instructions with patient  -Dispo: likely home pending the above    STEW Lowe MD PGY6

## 2023-01-05 LAB — SURGICAL PATHOLOGY STUDY: SIGNIFICANT CHANGE UP

## 2023-01-11 ENCOUNTER — APPOINTMENT (OUTPATIENT)
Dept: UROGYNECOLOGY | Facility: CLINIC | Age: 63
End: 2023-01-11
Payer: MEDICAID

## 2023-01-11 VITALS
BODY MASS INDEX: 29.84 KG/M2 | SYSTOLIC BLOOD PRESSURE: 177 MMHG | DIASTOLIC BLOOD PRESSURE: 100 MMHG | HEIGHT: 60 IN | HEART RATE: 65 BPM | OXYGEN SATURATION: 95 % | WEIGHT: 152 LBS

## 2023-01-11 DIAGNOSIS — N39.3 STRESS INCONTINENCE (FEMALE) (MALE): ICD-10-CM

## 2023-01-11 DIAGNOSIS — N81.11 CYSTOCELE, MIDLINE: ICD-10-CM

## 2023-01-11 DIAGNOSIS — N81.2 INCOMPLETE UTEROVAGINAL PROLAPSE: ICD-10-CM

## 2023-01-11 PROBLEM — I10 ESSENTIAL (PRIMARY) HYPERTENSION: Chronic | Status: ACTIVE | Noted: 2022-12-05

## 2023-01-11 PROBLEM — U07.1 COVID-19: Chronic | Status: ACTIVE | Noted: 2022-12-05

## 2023-01-11 PROCEDURE — 99024 POSTOP FOLLOW-UP VISIT: CPT

## 2023-01-11 NOTE — DISCUSSION/SUMMARY
[Post-Op instructions given. Pt/family verbalizes understanding] : post-operative instructions were provided to the patient/family who verbalize understanding [Risks/Benefits discussed. Pt/family verbalizes understanding] : risks and benefits of the procedure were discussed with the patient/family who verbalize understanding [FreeTextEntry1] : Pt doing well post op.  She will RTO in 4 weeks or sooner for next post op appt.  If pt have any problems/concern to call office.  PT verbalizes understanding and agrees.

## 2023-01-11 NOTE — SUBJECTIVE
[FreeTextEntry1] : Doing well.  Happy with surgery [FreeTextEntry8] : none [FreeTextEntry7] : Denies any pain.  Some occasional discomfort in the pelvic area. [FreeTextEntry6] : good [FreeTextEntry5] : Denies any trouble with urination, emptying of bladder, HAYDEN, or UUI. [FreeTextEntry4] : moving well [FreeTextEntry3] : normal and steady

## 2023-01-11 NOTE — OBJECTIVE
[Post Void Residual ____ ml] : Post Void Residual was [unfilled] ml [Soft and Nontender] : soft and nontender [Clean, Dry, Intact] : Clean, Dry, Intact [Good Support] : Good support [Healing well] : healing well [No Masses or Tenderness] : no masses or tenderness [FreeTextEntry3] : No mesh exposure noted.  No swelling, no tenderness.

## 2023-02-08 ENCOUNTER — APPOINTMENT (OUTPATIENT)
Dept: UROGYNECOLOGY | Facility: CLINIC | Age: 63
End: 2023-02-08
Payer: MEDICAID

## 2023-02-08 VITALS
BODY MASS INDEX: 25.61 KG/M2 | HEART RATE: 57 BPM | OXYGEN SATURATION: 97 % | WEIGHT: 150 LBS | HEIGHT: 64 IN | DIASTOLIC BLOOD PRESSURE: 87 MMHG | SYSTOLIC BLOOD PRESSURE: 170 MMHG

## 2023-02-08 DIAGNOSIS — Z98.890 OTHER SPECIFIED POSTPROCEDURAL STATES: ICD-10-CM

## 2023-02-08 PROCEDURE — 99024 POSTOP FOLLOW-UP VISIT: CPT

## 2023-02-08 NOTE — DISCUSSION/SUMMARY
[Post-Op instructions given. Pt/family verbalizes understanding] : post-operative instructions were provided to the patient/family who verbalize understanding [Risks/Benefits discussed. Pt/family verbalizes understanding] : risks and benefits of the procedure were discussed with the patient/family who verbalize understanding [FreeTextEntry1] : Bárbara, 63y/o s/p 12/20/2022 - RA-BELLA, BS, SC, MUS, and cysto.\par \par Pt doing well post op.  \par Pt will follow up in 12 months or sooner if needed.  If pt have any problems/concern to call office.  PT verbalizes understanding and agrees.

## 2023-02-08 NOTE — SUBJECTIVE
[FreeTextEntry1] : Doing well.  Pt very happy with surgery [FreeTextEntry8] : none [FreeTextEntry7] : Denies any pain or discomfort. [FreeTextEntry6] : good [FreeTextEntry5] : Denies any trouble with urination, emptying of bladder, HAYDEN, or UUI. [FreeTextEntry4] : moving well [FreeTextEntry3] : normal and steady

## (undated) DEVICE — XI ARM FORCEP MARYLAND BIPOLAR

## (undated) DEVICE — GLV 8.5 PROTEXIS (WHITE)

## (undated) DEVICE — ELCTR BOVIE PENCIL SMOKE EVACUATION

## (undated) DEVICE — POSITIONER PURPLE ARM ONE STEP (LARGE)

## (undated) DEVICE — NDL COUNTER FOAM AND MAGNET 40-70

## (undated) DEVICE — SPECIMEN CONTAINER 100ML

## (undated) DEVICE — DRSG KLING 4"

## (undated) DEVICE — SUT POLYSORB 2-0 18" V-20

## (undated) DEVICE — XI ARM NEEDLE DRIVER SUTURECUT MEGA 8MM

## (undated) DEVICE — SOL IRR POUR H2O 250ML

## (undated) DEVICE — XI ARM SCISSOR MONO CURVED

## (undated) DEVICE — SUT BIOSYN 4-0 18" P-12

## (undated) DEVICE — ENDOCATCH 10MM SPECIMEN POUCH

## (undated) DEVICE — XI ARM NEEDLE DRIVER LARGE

## (undated) DEVICE — XI ARM NEEDLE DRIVER MEGA

## (undated) DEVICE — XI ARM FORCEP TENACULUM

## (undated) DEVICE — SUT POLYSORB 0 30" GS-23

## (undated) DEVICE — TUBING SUCTION 20FT

## (undated) DEVICE — DRSG STERISTRIPS 0.5 X 4"

## (undated) DEVICE — SUT PDS II 2-0 27" SH

## (undated) DEVICE — XI DRAPE ARM

## (undated) DEVICE — PREP BETADINE KIT

## (undated) DEVICE — DRAPE TOWEL BLUE 17" X 24"

## (undated) DEVICE — XI DRAPE COLUMN

## (undated) DEVICE — SOL IRR POUR NS 0.9% 500ML

## (undated) DEVICE — TUBING AIRSEAL TRI-LUMEN FILTERED

## (undated) DEVICE — PREP CHLOROHEXIDINE 4% 118CC KIT

## (undated) DEVICE — PREP CHLORAPREP HI-LITE ORANGE 26ML

## (undated) DEVICE — LONE STAR ELASTIC STAY HOOK 5MM SHARP

## (undated) DEVICE — NDL HYPO REGULAR BEVEL 22G X 1.5" (TURQUOISE)

## (undated) DEVICE — POSITIONER FOAM EGG CRATE ULNAR 2PCS (PINK)

## (undated) DEVICE — SUT MAXON 2-0 27" T-5

## (undated) DEVICE — SYR LUER LOK 10CC

## (undated) DEVICE — GLV 8 PROTEXIS (WHITE)

## (undated) DEVICE — XI SEAL UNIV 5- 8 MM

## (undated) DEVICE — MEDICATION LABELS W MARKER

## (undated) DEVICE — PREP BETADINE SPONGE STICKS

## (undated) DEVICE — LONE STAR RETRACTOR RING 32.5CM X 18.3CM DISP

## (undated) DEVICE — TROCAR SURGIQUEST AIRSEAL 8MMX100MM

## (undated) DEVICE — SYR LUER LOK 20CC

## (undated) DEVICE — LAP PAD 4 X 18"

## (undated) DEVICE — DRAPE MAYO STAND 30"

## (undated) DEVICE — XI TIP COVER

## (undated) DEVICE — STAPLER SKIN VISI-STAT 35 WIDE

## (undated) DEVICE — LUBRICANT INST ELECTROLUBE Z SOLUTION

## (undated) DEVICE — GLV 6.5 PROTEXIS (WHITE)

## (undated) DEVICE — D HELP - CLEARVIEW CLEARIFY SYSTEM

## (undated) DEVICE — MARKING PEN W RULER

## (undated) DEVICE — SUT CLIP LAPRA-TY ABSORBABLE SIZE 0.118 TO 0.12" VIOLET

## (undated) DEVICE — DRAPE LIGHT HANDLE COVER (BLUE)

## (undated) DEVICE — SUT TICRON 2-0 30" GS22

## (undated) DEVICE — GLV 7 PROTEXIS (WHITE)

## (undated) DEVICE — XI OBTURATOR OPTICAL BLADELESS 8MM

## (undated) DEVICE — SUT GORETEX CV-2 (0) 36" PH-24

## (undated) DEVICE — DRAPE 3/4 SHEET W REINFORCEMENT 56X77"

## (undated) DEVICE — XI ARM GRASPER TIP UP FENESTRATED

## (undated) DEVICE — SHEARS COVIDIEN ENDO SHEAR 5MM X 31CM W UNIPOLAR CAUTERY

## (undated) DEVICE — ENDOCATCH II 15MM

## (undated) DEVICE — DRAPE INSTRUMENT POUCH 6.75" X 11"

## (undated) DEVICE — WARMING BLANKET UPPER ADULT

## (undated) DEVICE — BLADE SCALPEL SAFETYLOCK #15

## (undated) DEVICE — XI ARM FORCEP FENESTRATED BIPOLAR 8MM

## (undated) DEVICE — SUCTION YANKAUER NO CONTROL VENT

## (undated) DEVICE — FOLEY CATH 2-WAY 18FR 5CC SILICONE

## (undated) DEVICE — TUBING STRYKEFLOW II SUCTION / IRRIGATOR

## (undated) DEVICE — SUT POLYSORB 2-0 30" V-20 UNDYED

## (undated) DEVICE — SOL IRR BAG H2O 3000ML

## (undated) DEVICE — FOLEY CATH 2-WAY 18FR 5CC LATEX HYDROGEL

## (undated) DEVICE — SUT BIOSYN 2-0 27" V-20

## (undated) DEVICE — GLV 7.5 PROTEXIS (WHITE)

## (undated) DEVICE — FOLEY TRAY 16FR LF URINE METER SURESTEP

## (undated) DEVICE — VENODYNE/SCD SLEEVE CALF LARGE

## (undated) DEVICE — PACK GYN LAPAROSCOPY

## (undated) DEVICE — POSITIONER PINK PAD PIGAZZI SYSTEM

## (undated) DEVICE — TUBING TUR 2 PRONG

## (undated) DEVICE — NDL HYPO SAFE 22G X 1.5" (BLACK)